# Patient Record
Sex: MALE | Race: WHITE
[De-identification: names, ages, dates, MRNs, and addresses within clinical notes are randomized per-mention and may not be internally consistent; named-entity substitution may affect disease eponyms.]

---

## 2018-01-05 ENCOUNTER — HOSPITAL ENCOUNTER (EMERGENCY)
Dept: HOSPITAL 94 - ER | Age: 53
Discharge: HOME | End: 2018-01-05
Payer: MEDICAID

## 2018-01-05 VITALS — DIASTOLIC BLOOD PRESSURE: 70 MMHG | SYSTOLIC BLOOD PRESSURE: 127 MMHG

## 2018-01-05 VITALS — HEIGHT: 70 IN | BODY MASS INDEX: 28.41 KG/M2 | WEIGHT: 198.42 LBS

## 2018-01-05 DIAGNOSIS — G43.909: ICD-10-CM

## 2018-01-05 DIAGNOSIS — E11.9: ICD-10-CM

## 2018-01-05 DIAGNOSIS — J20.9: Primary | ICD-10-CM

## 2018-01-05 DIAGNOSIS — B34.9: ICD-10-CM

## 2018-01-05 LAB
ALBUMIN SERPL BCP-MCNC: 3.4 G/DL (ref 3.4–5)
ALBUMIN/GLOB SERPL: 0.9 {RATIO} (ref 1.1–1.5)
ALP SERPL-CCNC: 79 IU/L (ref 46–116)
ALT SERPL W P-5'-P-CCNC: 86 U/L (ref 12–78)
ANION GAP SERPL CALCULATED.3IONS-SCNC: 9 MMOL/L (ref 8–16)
AST SERPL W P-5'-P-CCNC: 51 U/L (ref 10–37)
BASOPHILS # BLD AUTO: 0 X10'3 (ref 0–0.2)
BASOPHILS NFR BLD AUTO: 0.3 % (ref 0–1)
BILIRUB SERPL-MCNC: 0.6 MG/DL (ref 0.1–1)
BUN SERPL-MCNC: 18 MG/DL (ref 7–18)
BUN/CREAT SERPL: 24.3 (ref 5.4–32)
CALCIUM SERPL-MCNC: 9.2 MG/DL (ref 8.5–10.1)
CHLORIDE SERPL-SCNC: 105 MMOL/L (ref 99–107)
CO2 SERPL-SCNC: 26.5 MMOL/L (ref 24–32)
CREAT SERPL-MCNC: 0.74 MG/DL (ref 0.6–1.1)
EOSINOPHIL # BLD AUTO: 0.1 X10'3 (ref 0–0.9)
EOSINOPHIL NFR BLD AUTO: 1.8 % (ref 0–6)
ERYTHROCYTE [DISTWIDTH] IN BLOOD BY AUTOMATED COUNT: 14.6 % (ref 11.5–14.5)
GFR SERPL CREATININE-BSD FRML MDRD: > 90 ML/MIN
GLUCOSE SERPL-MCNC: 151 MG/DL (ref 70–104)
HCT VFR BLD AUTO: 38.9 % (ref 42–52)
HGB BLD-MCNC: 13.3 G/DL (ref 14–17.9)
LYMPHOCYTES # BLD AUTO: 1.9 X10'3 (ref 1.1–4.8)
LYMPHOCYTES NFR BLD AUTO: 23 % (ref 21–51)
MCH RBC QN AUTO: 28.9 PG (ref 27–31)
MCHC RBC AUTO-ENTMCNC: 34.2 % (ref 33–36.5)
MCV RBC AUTO: 84.4 FL (ref 78–98)
MONOCYTES # BLD AUTO: 0.9 X10'3 (ref 0–0.9)
MONOCYTES NFR BLD AUTO: 10.9 % (ref 2–12)
NEUTROPHILS # BLD AUTO: 5.3 X10'3 (ref 1.8–7.7)
NEUTROPHILS NFR BLD AUTO: 64 % (ref 42–75)
PLATELET # BLD AUTO: 189 X10'3 (ref 140–440)
PMV BLD AUTO: 8.2 FL (ref 7.4–10.4)
POTASSIUM SERPL-SCNC: 3.6 MMOL/L (ref 3.5–5.1)
PROT SERPL-MCNC: 7 G/DL (ref 6.4–8.2)
RBC # BLD AUTO: 4.61 X10'6 (ref 4.7–6.1)
SODIUM SERPL-SCNC: 140 MMOL/L (ref 135–145)
WBC # BLD AUTO: 8.3 X10'3 (ref 4.5–11)

## 2018-01-05 PROCEDURE — 71046 X-RAY EXAM CHEST 2 VIEWS: CPT

## 2018-01-05 PROCEDURE — 87502 INFLUENZA DNA AMP PROBE: CPT

## 2018-01-05 PROCEDURE — 85025 COMPLETE CBC W/AUTO DIFF WBC: CPT

## 2018-01-05 PROCEDURE — 99285 EMERGENCY DEPT VISIT HI MDM: CPT

## 2018-01-05 PROCEDURE — 87503 INFLUENZA DNA AMP PROB ADDL: CPT

## 2018-01-05 PROCEDURE — 80053 COMPREHEN METABOLIC PANEL: CPT

## 2018-01-05 PROCEDURE — 36415 COLL VENOUS BLD VENIPUNCTURE: CPT

## 2019-04-02 ENCOUNTER — HOSPITAL ENCOUNTER (EMERGENCY)
Dept: HOSPITAL 94 - ER | Age: 54
Discharge: HOME | End: 2019-04-02
Payer: MEDICAID

## 2019-04-02 VITALS — SYSTOLIC BLOOD PRESSURE: 126 MMHG | DIASTOLIC BLOOD PRESSURE: 94 MMHG

## 2019-04-02 VITALS — HEIGHT: 69 IN | BODY MASS INDEX: 26.72 KG/M2 | WEIGHT: 180.38 LBS

## 2019-04-02 DIAGNOSIS — Z79.899: ICD-10-CM

## 2019-04-02 DIAGNOSIS — R10.32: ICD-10-CM

## 2019-04-02 DIAGNOSIS — G43.909: ICD-10-CM

## 2019-04-02 DIAGNOSIS — M54.5: Primary | ICD-10-CM

## 2019-04-02 DIAGNOSIS — E11.9: ICD-10-CM

## 2019-04-02 PROCEDURE — 96372 THER/PROPH/DIAG INJ SC/IM: CPT

## 2019-04-02 PROCEDURE — 72100 X-RAY EXAM L-S SPINE 2/3 VWS: CPT

## 2019-04-02 PROCEDURE — 99284 EMERGENCY DEPT VISIT MOD MDM: CPT

## 2019-06-10 ENCOUNTER — HOSPITAL ENCOUNTER (EMERGENCY)
Dept: HOSPITAL 94 - ER | Age: 54
Discharge: HOME | End: 2019-06-10
Payer: MEDICAID

## 2019-06-10 VITALS — DIASTOLIC BLOOD PRESSURE: 84 MMHG | SYSTOLIC BLOOD PRESSURE: 144 MMHG

## 2019-06-10 VITALS — HEIGHT: 70 IN | WEIGHT: 175.38 LBS | BODY MASS INDEX: 25.11 KG/M2

## 2019-06-10 DIAGNOSIS — Z86.19: ICD-10-CM

## 2019-06-10 DIAGNOSIS — F17.200: ICD-10-CM

## 2019-06-10 DIAGNOSIS — Z59.0: ICD-10-CM

## 2019-06-10 DIAGNOSIS — F15.90: ICD-10-CM

## 2019-06-10 DIAGNOSIS — M19.90: ICD-10-CM

## 2019-06-10 DIAGNOSIS — Z79.899: ICD-10-CM

## 2019-06-10 DIAGNOSIS — Z56.0: ICD-10-CM

## 2019-06-10 DIAGNOSIS — R20.2: Primary | ICD-10-CM

## 2019-06-10 DIAGNOSIS — E11.9: ICD-10-CM

## 2019-06-10 PROCEDURE — 99281 EMR DPT VST MAYX REQ PHY/QHP: CPT

## 2019-06-10 SDOH — ECONOMIC STABILITY - INCOME SECURITY: UNEMPLOYMENT, UNSPECIFIED: Z56.0

## 2019-06-10 SDOH — ECONOMIC STABILITY - HOUSING INSECURITY: HOMELESSNESS: Z59.0

## 2019-08-16 ENCOUNTER — HOSPITAL ENCOUNTER (EMERGENCY)
Dept: HOSPITAL 94 - ER | Age: 54
Discharge: HOME | End: 2019-08-16
Payer: MEDICAID

## 2019-08-16 VITALS — DIASTOLIC BLOOD PRESSURE: 92 MMHG | SYSTOLIC BLOOD PRESSURE: 162 MMHG

## 2019-08-16 DIAGNOSIS — G89.29: Primary | ICD-10-CM

## 2019-08-16 DIAGNOSIS — M19.90: ICD-10-CM

## 2019-08-16 DIAGNOSIS — F15.90: ICD-10-CM

## 2019-08-16 DIAGNOSIS — F10.99: ICD-10-CM

## 2019-08-16 DIAGNOSIS — E11.9: ICD-10-CM

## 2019-08-16 DIAGNOSIS — Z79.899: ICD-10-CM

## 2019-08-16 DIAGNOSIS — Z86.19: ICD-10-CM

## 2019-08-16 DIAGNOSIS — Z56.0: ICD-10-CM

## 2019-08-16 DIAGNOSIS — Z59.0: ICD-10-CM

## 2019-08-16 DIAGNOSIS — M54.2: ICD-10-CM

## 2019-08-16 DIAGNOSIS — M79.601: ICD-10-CM
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
## 2019-08-16 PROCEDURE — 96374 THER/PROPH/DIAG INJ IV PUSH: CPT

## 2019-08-16 PROCEDURE — 99283 EMERGENCY DEPT VISIT LOW MDM: CPT

## 2019-08-16 SDOH — ECONOMIC STABILITY - HOUSING INSECURITY: HOMELESSNESS: Z59.0

## 2019-08-16 SDOH — ECONOMIC STABILITY - INCOME SECURITY: UNEMPLOYMENT, UNSPECIFIED: Z56.0

## 2019-08-20 ENCOUNTER — HOSPITAL ENCOUNTER (EMERGENCY)
Dept: HOSPITAL 94 - ER | Age: 54
End: 2019-08-20
Payer: MEDICAID

## 2019-08-20 VITALS — WEIGHT: 180.78 LBS | BODY MASS INDEX: 26.78 KG/M2 | HEIGHT: 69 IN

## 2019-08-20 DIAGNOSIS — G43.909: ICD-10-CM

## 2019-08-20 DIAGNOSIS — Z79.899: ICD-10-CM

## 2019-08-20 DIAGNOSIS — I46.9: Primary | ICD-10-CM

## 2019-08-20 DIAGNOSIS — G89.29: ICD-10-CM

## 2019-08-20 DIAGNOSIS — E11.9: ICD-10-CM

## 2019-08-20 LAB — CO2 SERPL-SCNC: (no result) MMOL/L

## 2019-08-20 PROCEDURE — 80047 BASIC METABLC PNL IONIZED CA: CPT

## 2019-08-20 PROCEDURE — 99285 EMERGENCY DEPT VISIT HI MDM: CPT

## 2019-08-20 PROCEDURE — 92950 HEART/LUNG RESUSCITATION CPR: CPT

## 2019-08-20 NOTE — NUR
Dr. Resendiz speaking with Kwabena (brother), Albert (brother) and several other 
family members here in ER. Family requesting pt body sent to [facility]'s 
cre[facility]atorium on [address]. 

-------------------------------------------------------------------------------

Addendum: 08/20/19 at 1906 by JHONNY

-------------------------------------------------------------------------------

Family reports pt was homeless and currently staying on sister's couch at 
residence that EMS responded to.

## 2019-08-21 LAB
ANION GAP BLD CALC-SCNC: (no result) MMOL/L (ref 8–12)
BUN BLD-MCNC: (no result) MG/DL (ref 6–19)
BUN/CREAT BLD: (no result) (ref 5.4–32)
CA-I BLD-SCNC: (no result) MMOL/L (ref 1.03–1.32)
CHLORIDE BLD-SCNC: (no result) MMOL/L (ref 99–107)
CO2 BLD-SCNC: (no result) MMOL/L (ref 24–32)
CREAT BLD-MCNC: (no result) MG/DL (ref 0.8–1.3)
GFR SERPL CREATININE-BSD FRML MDRD: (no result) ML/MIN
GLUCOSE SERPLBLD-MCNC: (no result) MG/DL (ref 70–104)
HCT VFR BLD CALC: (no result) %PCV (ref 42–52)
HGB BLD CALC-MCNC: (no result) G/DL (ref 14–18)
POTASSIUM BLD-SCNC: (no result) MMOL/L (ref 3.5–5.1)
SODIUM BLD-SCNC: (no result) MMOL/L (ref 135–145)

## 2019-08-21 NOTE — NUR
LATE ENTERY :  10 CC OF BLOOD WAS DRAWN FROM RIGHT NECK VEIN.  BLOOD WAS PLACED 
IN RAINBOW TUBES AND ONE EXTRA GREEN TUBE BY .  I TOOK 1 GREEN TOP INTO 
THE BACK ROOM AND RAN AN I-STAT ON IT.  WHEN I CAME OUT OF THE ROOM WITH THE 
RESULTS THE PT. HAD BEEN PRONOUNCED BY DR. FRANZ.  I TOLD HIM THE  
WOULD WANT TO TAKE THE BLOOD SO I WOULD SEND IT TO THE LAB TO BE PLACED IN THE 
FRIDG.  I ASKED HIM DID HE WANT THE LAB TO RUN ANY OF THE TEST HE HAD ORDERED.  
HE SAID NO THERE IS NO NEED NOW.  THE I-STAT TUBE WAS SPUN IN ;

-------------------------------------------------------------------------------

Addendum: 08/21/19 at 0727 by PAULINE

-------------------------------------------------------------------------------

THE I-STAT TUBE HAD HEMOLISED SO THE RESULTS WERE WRONG.  THE LAB CANCELED ALL 
THE OTHER TEST THAT HAD BEEN ORDERED.   CAME AND PICKED UP PT. AND DID 
ASK FOR THE BLOOD TUBES THAT WE NELSON.